# Patient Record
Sex: FEMALE | Race: WHITE | Employment: UNEMPLOYED | URBAN - METROPOLITAN AREA
[De-identification: names, ages, dates, MRNs, and addresses within clinical notes are randomized per-mention and may not be internally consistent; named-entity substitution may affect disease eponyms.]

---

## 2020-08-01 ENCOUNTER — HOSPITAL ENCOUNTER (EMERGENCY)
Age: 35
Discharge: HOME | End: 2020-08-01
Attending: EMERGENCY MEDICINE

## 2020-08-01 VITALS
DIASTOLIC BLOOD PRESSURE: 68 MMHG | OXYGEN SATURATION: 100 % | WEIGHT: 160 LBS | TEMPERATURE: 98 F | HEIGHT: 62 IN | RESPIRATION RATE: 18 BRPM | HEART RATE: 94 BPM | BODY MASS INDEX: 29.44 KG/M2 | SYSTOLIC BLOOD PRESSURE: 134 MMHG

## 2020-08-01 DIAGNOSIS — L03.113 CELLULITIS OF RIGHT UPPER ARM: Primary | ICD-10-CM

## 2020-08-01 LAB
ANION GAP SERPL CALC-SCNC: 4 MMOL/L (ref 7–16)
BUN SERPL-MCNC: 12 MG/DL (ref 6–23)
CALCIUM SERPL-MCNC: 8.7 MG/DL (ref 8.3–10.4)
CHLORIDE SERPL-SCNC: 101 MMOL/L (ref 98–107)
CO2 SERPL-SCNC: 33 MMOL/L (ref 21–32)
CREAT SERPL-MCNC: 0.66 MG/DL (ref 0.6–1)
CRP SERPL-MCNC: 0.6 MG/DL (ref 0–0.9)
ERYTHROCYTE [DISTWIDTH] IN BLOOD BY AUTOMATED COUNT: 12.4 % (ref 11.9–14.6)
GLUCOSE SERPL-MCNC: 69 MG/DL (ref 65–100)
HCT VFR BLD AUTO: 34.4 % (ref 35.8–46.3)
HGB BLD-MCNC: 11.8 G/DL (ref 11.7–15.4)
LACTATE SERPL-SCNC: 2.4 MMOL/L (ref 0.4–2)
MCH RBC QN AUTO: 29.1 PG (ref 26.1–32.9)
MCHC RBC AUTO-ENTMCNC: 34.3 G/DL (ref 31.4–35)
MCV RBC AUTO: 84.9 FL (ref 79.6–97.8)
NRBC # BLD: 0 K/UL (ref 0–0.2)
PLATELET # BLD AUTO: 344 K/UL (ref 150–450)
PMV BLD AUTO: 9.2 FL (ref 9.4–12.3)
POTASSIUM SERPL-SCNC: 3.8 MMOL/L (ref 3.5–5.1)
RBC # BLD AUTO: 4.05 M/UL (ref 4.05–5.2)
SODIUM SERPL-SCNC: 138 MMOL/L (ref 136–145)
WBC # BLD AUTO: 9.7 K/UL (ref 4.3–11.1)

## 2020-08-01 PROCEDURE — 87040 BLOOD CULTURE FOR BACTERIA: CPT

## 2020-08-01 PROCEDURE — 74011000250 HC RX REV CODE- 250: Performed by: EMERGENCY MEDICINE

## 2020-08-01 PROCEDURE — 86140 C-REACTIVE PROTEIN: CPT

## 2020-08-01 PROCEDURE — 96374 THER/PROPH/DIAG INJ IV PUSH: CPT

## 2020-08-01 PROCEDURE — 80048 BASIC METABOLIC PNL TOTAL CA: CPT

## 2020-08-01 PROCEDURE — 85027 COMPLETE CBC AUTOMATED: CPT

## 2020-08-01 PROCEDURE — 99283 EMERGENCY DEPT VISIT LOW MDM: CPT

## 2020-08-01 PROCEDURE — 83605 ASSAY OF LACTIC ACID: CPT

## 2020-08-01 PROCEDURE — 74011250636 HC RX REV CODE- 250/636: Performed by: EMERGENCY MEDICINE

## 2020-08-01 RX ORDER — ONDANSETRON 4 MG/1
4 TABLET, ORALLY DISINTEGRATING ORAL
Status: DISCONTINUED | OUTPATIENT
Start: 2020-08-01 | End: 2020-08-01 | Stop reason: HOSPADM

## 2020-08-01 RX ORDER — CEPHALEXIN 500 MG/1
500 CAPSULE ORAL 4 TIMES DAILY
Qty: 28 CAP | Refills: 0 | Status: SHIPPED | OUTPATIENT
Start: 2020-08-01 | End: 2020-08-08

## 2020-08-01 RX ADMIN — CEFAZOLIN 2 G: 1 INJECTION, POWDER, FOR SOLUTION INTRAMUSCULAR; INTRAVENOUS at 02:39

## 2020-08-01 NOTE — ED TRIAGE NOTES
Patient presents from home with c/o rash on her left arm. She has two lesions on elbow that look like are healing but now has a red rash that is developing around elbow up the backside of her left arm. Patient states it is warm, painful and swollen.  Patient masked on arrival.

## 2020-08-01 NOTE — ED PROVIDER NOTES
[facility] Emergency Department  Arrival Date/Time: 8/1/2020 @ Hossein Montague  MRN: [id number]      27 y.o. female    YOB: 1985   Telephone Information:   Mobile [phone] (home)     [facility] EMERGENCY DEPT ER08/08  Seen on 8/1/2020 @ 2:09 AM       Chief Complaint   Patient presents with    Rash     HPI: 79-year-old female presents to the emergency department with area of redness swelling on the back of her left arm above the elbow    She has several lesions on her legs and her left elbow. The redness spreading up the back of her arm started a few days ago. Hot to the touch. Bright red. Sharply demarcated. No fluctuance is palpable    No fevers reported. She is not tachycardic or ill-appearing. HPI    Historian: patient    Review of Systems: .addros     . zaddrostable   No fever  No vomiting  No sob  No cp    Allergies: No Known Allergies      Key Anti-Platelet Anticoagulant Meds     The patient is on no antiplatelet meds or anticoagulants. Physical Exam:  Nursing documentation reviewed. Vitals:    08/01/20 0208 08/01/20 0212 08/01/20 0217 08/01/20 0317   BP: 136/67   134/68   Pulse: 96   94   Resp: 18   18   Temp: 98 °F (36.7 °C)   98 °F (36.7 °C)   SpO2: 100% 100% 100% 100%    Vital signs were reviewed. Physical Exam  Vitals signs and nursing note reviewed. Constitutional:       Appearance: Normal appearance. She is not ill-appearing. Cardiovascular:      Rate and Rhythm: Normal rate. Pulses: Normal pulses. Pulmonary:      Effort: Pulmonary effort is normal. No respiratory distress. Breath sounds: No wheezing. Skin:         Neurological:      Mental Status: She is alert. MEDICAL DECISION MAKING:     This is a new problem that does need additional workup  Labs/Radiographs/ECG were ordered: yes  Old records were reviewed:  CT/US/XRay/MRI were visualized by me:     The patient's problem is: moderate  The Diagnostic Options are: minimal risk  The Management Options are: moderate risk     Data:    Lab findings during this visit (only abnormal values will be noted):   Labs Reviewed   CBC W/O DIFF - Abnormal; Notable for the following components:       Result Value    HCT 34.4 (*)     MPV 9.2 (*)     All other components within normal limits   METABOLIC PANEL, BASIC - Abnormal; Notable for the following components:    CO2 33 (*)     Anion gap 4 (*)     All other components within normal limits   LACTIC ACID - Abnormal; Notable for the following components:    Lactic acid 2.4 (*)     All other components within normal limits   CULTURE, BLOOD   C REACTIVE PROTEIN, QT      Radiology studies during this visit: No results found. Medications given in the ED:   Medications   ceFAZolin (ANCEF) 2 g in sterile water (preservative free) 20 mL IV syringe (2 g IntraVENous Given 8/1/20 0239)   ondansetron (ZOFRAN ODT) tablet 4 mg (has no administration in time range)        Recheck:   Resting comfortably    Her labs reviewed. White counts normal.  CRP is normal.  Lactate is slightly elevated 2.4    She is afebrile. She is not tachycardic or hypotensive    I do not think she has severe sepsis or septic shock    She has been given a dose of antibiotics here in the emergency department. We will start her on p.o. antibiotics have her recheck here in 2 days. Other ED Course Notes:        I wore appropriate PPE throughout this patient's ED encounter. Procedure Documentation: Procedures     Assessment and Plan:    Impression:     ICD-10-CM ICD-9-CM   1.  Cellulitis of right upper arm  L03.113 682.3      Condition at Discharge: stable  Disposition: home  Follow-up:   Follow-up Information     Follow up With Specialties Details Why [facility] EMERGENCY DEPT Emergency Medicine  come back if you get worse or have any new problems [address] [place] [facility] [phone]         Discharge Medications: Discharge Medication List as of 8/1/2020  3:04 AM      START taking these medications    Details   cephALEXin (Keflex) 500 mg capsule Take 1 Cap by mouth four (4) times daily for 7 days. , Dorys, Disp-28 Cap,R-0         CONTINUE these medications which have NOT CHANGED    Details   traMADol (ULTRAM) 50 mg tablet Take 1 Tab by mouth every six (6) hours as needed for Pain. Max Daily Amount: 200 mg., Print, Disp-20 Tab, R-0              8/1/2020  3:18 AM    Past Medical History: Primary Care Doctor: Unknown, Provider     Past Medical History:   Diagnosis Date    Asthma      Past Surgical History:   Procedure Laterality Date    HX GYN      c/section    HX HEENT      tonsillectomy     Social History     Tobacco Use    Smoking status: Current Every Day Smoker     Packs/day: 1.00    Smokeless tobacco: Never Used   Substance Use Topics    Alcohol use: No    Drug use: No      Home Medication:   Prior to Admission Medications   Prescriptions Last Dose Informant Patient Reported? Taking?   traMADol (ULTRAM) 50 mg tablet   No No   Sig: Take 1 Tab by mouth every six (6) hours as needed for Pain. Max Daily Amount: 200 mg.       Facility-Administered Medications: None

## 2020-08-01 NOTE — DISCHARGE INSTRUCTIONS
Take antibiotics as directed    Follow-up with your doctor or come back here on Monday for recheck of the infection in your arm    If you spike a fever or noticed that the redness is progressing please come back right away    You will notice that the redness worsens tomorrow as the antibiotics start kicking in. That is expected.

## 2020-08-01 NOTE — ED NOTES
I have reviewed discharge instructions with the patient. The patient verbalized understanding. Patient left ED via Discharge Method: ambulatory to Home with friend. Opportunity for questions and clarification provided. Patient given 1 scripts. To continue your aftercare when you leave the hospital, you may receive an automated call from our care team to check in on how you are doing. This is a free service and part of our promise to provide the best care and service to meet your aftercare needs.  If you have questions, or wish to unsubscribe from this service please call [phone]. Thank you for Choosing our [facility] Emergency Department.

## 2020-08-06 LAB
BACTERIA SPEC CULT: NORMAL
SERVICE CMNT-IMP: NORMAL